# Patient Record
Sex: FEMALE | Race: WHITE
[De-identification: names, ages, dates, MRNs, and addresses within clinical notes are randomized per-mention and may not be internally consistent; named-entity substitution may affect disease eponyms.]

---

## 2019-11-18 ENCOUNTER — HOSPITAL ENCOUNTER (EMERGENCY)
Dept: HOSPITAL 25 - UCCORT | Age: 19
Discharge: HOME | End: 2019-11-18
Payer: COMMERCIAL
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
## 2019-11-18 DIAGNOSIS — S93.601A: Primary | ICD-10-CM
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
## 2019-11-18 PROCEDURE — 99202 OFFICE O/P NEW SF 15 MIN: CPT

## 2019-11-18 PROCEDURE — G0463 HOSPITAL OUTPT CLINIC VISIT: HCPCS

## 2019-11-18 NOTE — UC
Lower Extremity/Ankle HPI





- HPI Summary


HPI Summary: 





19-year-old female who tripped and twisted her right ankle/foot 2 nights ago.  

She is ambulatory.  She complains of pain more to the proximal foot not of the 

ankle.





- History of Current Complaint


Chief Complaint: UCLowerExtremity


Stated Complaint: RT FOOT INJURY


Time Seen by Provider: 11/18/19 11:02


Hx Obtained From: Patient


Hx Last Menstrual Period: 10/24/19


Pregnant?: No


Onset/Duration: Sudden Onset


Severity Initially: Moderate


Severity Currently: Mild


Pain Intensity: 5


Aggravating Factor(s): Ambulation


Alleviating Factor(s): Rest


Able to Bear Weight: Yes





- Allergies/Home Medications


Allergies/Adverse Reactions: 


 Allergies











Allergy/AdvReac Type Severity Reaction Status Date / Time


 


No Known Allergies Allergy   Verified 11/18/19 10:50











Home Medications: 


 Home Medications





NK [No Home Medications Reported]  11/18/19 [History Confirmed 11/18/19]











PMH/Surg Hx/FS Hx/Imm Hx


Previously Healthy: Yes





- Surgical History


Surgical History: Yes


Surgery Procedure, Year, and Place: left knee-ACL, left foot





- Family History


Known Family History: Positive: Non-Contributory





- Social History


Occupation: Student


Lives: Dormitory/Roommates


Alcohol Use: Rare


Substance Use Type: None


Smoking Status (MU): Never Smoked Tobacco





Review of Systems


All Other Systems Reviewed And Are Negative: Yes


Skin: Positive: Bruising - Very minimal bruising to proximal dorsum of right 

foot.


Motor: Positive: Negative


Neurovascular: Positive: Negative


Musculoskeletal: Positive: Negative


Neurological: Positive: Negative


Is Patient Immunocompromised?: No





Physical Exam


Triage Information Reviewed: Yes


Appearance: Well-Appearing, No Pain Distress, Well-Nourished


Vital Signs: 


 Initial Vital Signs











Temp  97.7 F   11/18/19 10:49


 


Pulse  78   11/18/19 10:49


 


Resp  14   11/18/19 10:49


 


BP  120/66   11/18/19 10:49


 


Pulse Ox  100   11/18/19 10:49











Vital Signs Reviewed: Yes


Musculoskeletal: Positive: Strength Intact, ROM Intact, Other: - Minimal 

bruising to proximal dorsum of right foot with minimal swelling.  Good 

peripheral pulses, neuro sensation and capillary refill.  Full range of motion.

  Achilles is intact.  Ankle is nontender with no swelling.  Mild tenderness on 

palpation over the bruise located on the proximal dorsum right foot.


Neurological Exam: Normal


Psychological Exam: Normal


Skin: Positive: Other - See above notes.





Lower Extremity Course/Dx





- Course


Course Of Treatment: 





Right foot x-ray:FINDINGS: BONE DENSITY: Normal. BONES: There is no displaced 

fracture. JOINTS: There is no arthropathy. ALIGNMENT: There is no dislocation. 

SOFT TISSUES: Unremarkable. OTHER FINDINGS: None. IMPRESSION: NO ACUTE OSSEOUS 

INJURY. IF SYMPTOMS PERSIST, RECOMMEND REPEAT IMAGING.








Patient has crutches at home which she'll use.  An Ace bandage was applied and 

she is to follow-up with the orthopedist if she has any further concerns over 

the next few days.





- Differential Dx/Diagnosis


Provider Diagnosis: 


 Right foot sprain








Discharge ED





- Sign-Out/Discharge


Documenting (check all that apply): Patient Departure


All imaging exams completed and their final reports reviewed: Yes





- Discharge Plan


Condition: Good


Disposition: HOME


Patient Education Materials:  Foot Sprain (ED)


Referrals: 


No Primary Care Phys,NOPCP [Primary Care Provider] - 


Garrett Flores MD [Medical Doctor] - 


Additional Instructions: 


Ice and elevate intermittently over the next day or 2.  May take Tylenol or 

Motrin for pain.  Definite follow-up with the orthopedist in 4 or 5 days if no 

improvement.  Ambulate as pain permits.





- Billing Disposition and Condition


Condition: GOOD


Disposition: Home





- Attestation Statements


Provider Attestation: 





I was available for consult. This patient was seen by the EVELIO. The patient was 

not presented to, seen by, or examined by me. -Yann

## 2025-05-10 ENCOUNTER — NON-APPOINTMENT (OUTPATIENT)
Age: 25
End: 2025-05-10

## 2025-05-21 PROBLEM — Z00.00 ENCOUNTER FOR PREVENTIVE HEALTH EXAMINATION: Status: ACTIVE | Noted: 2025-05-21

## 2025-05-22 ENCOUNTER — NON-APPOINTMENT (OUTPATIENT)
Age: 25
End: 2025-05-22

## 2025-05-22 ENCOUNTER — APPOINTMENT (OUTPATIENT)
Dept: OBGYN | Facility: CLINIC | Age: 25
End: 2025-05-22
Payer: COMMERCIAL

## 2025-05-22 VITALS
HEIGHT: 65 IN | WEIGHT: 145 LBS | SYSTOLIC BLOOD PRESSURE: 111 MMHG | DIASTOLIC BLOOD PRESSURE: 72 MMHG | BODY MASS INDEX: 24.16 KG/M2

## 2025-05-22 DIAGNOSIS — F12.90 CANNABIS USE, UNSPECIFIED, UNCOMPLICATED: ICD-10-CM

## 2025-05-22 DIAGNOSIS — Z80.3 FAMILY HISTORY OF MALIGNANT NEOPLASM OF BREAST: ICD-10-CM

## 2025-05-22 DIAGNOSIS — Z01.419 ENCOUNTER FOR GYNECOLOGICAL EXAMINATION (GENERAL) (ROUTINE) W/OUT ABNORMAL FINDINGS: ICD-10-CM

## 2025-05-22 DIAGNOSIS — Z78.9 OTHER SPECIFIED HEALTH STATUS: ICD-10-CM

## 2025-05-22 PROCEDURE — 99385 PREV VISIT NEW AGE 18-39: CPT

## 2025-05-29 LAB
C TRACH RRNA SPEC QL NAA+PROBE: NOT DETECTED
CYTOLOGY CVX/VAG DOC THIN PREP: NORMAL
N GONORRHOEA RRNA SPEC QL NAA+PROBE: NOT DETECTED
SOURCE AMPLIFICATION: NORMAL
SOURCE AMPLIFICATION: NORMAL
T VAGINALIS RRNA SPEC QL NAA+PROBE: NOT DETECTED

## 2025-06-13 ENCOUNTER — APPOINTMENT (OUTPATIENT)
Dept: OBGYN | Facility: CLINIC | Age: 25
End: 2025-06-13